# Patient Record
Sex: MALE | Race: WHITE | URBAN - METROPOLITAN AREA
[De-identification: names, ages, dates, MRNs, and addresses within clinical notes are randomized per-mention and may not be internally consistent; named-entity substitution may affect disease eponyms.]

---

## 2017-02-08 ENCOUNTER — FOLLOW UP ESTABLISHED (OUTPATIENT)
Dept: URBAN - METROPOLITAN AREA CLINIC 85 | Facility: CLINIC | Age: 82
End: 2017-02-08
Payer: MEDICARE

## 2017-02-08 DIAGNOSIS — H25.11 AGE-RELATED NUCLEAR CATARACT, RIGHT EYE: ICD-10-CM

## 2017-02-08 DIAGNOSIS — H25.812 COMBINED FORMS OF AGE-RELATED CATARACT, LEFT EYE: Primary | ICD-10-CM

## 2017-02-08 PROCEDURE — 92134 CPTRZ OPH DX IMG PST SGM RTA: CPT | Performed by: OPTOMETRIST

## 2017-02-08 PROCEDURE — 99214 OFFICE O/P EST MOD 30 MIN: CPT | Performed by: OPTOMETRIST

## 2017-02-08 ASSESSMENT — INTRAOCULAR PRESSURE
OD: 18
OS: 19

## 2017-02-08 ASSESSMENT — VISUAL ACUITY: OS: 20/50

## 2017-02-20 ENCOUNTER — NEW PATIENT (OUTPATIENT)
Dept: URBAN - METROPOLITAN AREA CLINIC 85 | Facility: CLINIC | Age: 82
End: 2017-02-20
Payer: MEDICARE

## 2017-02-20 DIAGNOSIS — Z79.84 LONG TERM (CURRENT) USE OF ORAL ANTIDIABETIC DRUGS: ICD-10-CM

## 2017-02-20 DIAGNOSIS — H25.13 AGE-RELATED NUCLEAR CATARACT, BILATERAL: Primary | ICD-10-CM

## 2017-02-20 PROCEDURE — 92004 COMPRE OPH EXAM NEW PT 1/>: CPT | Performed by: OPHTHALMOLOGY

## 2017-02-20 PROCEDURE — 92015 DETERMINE REFRACTIVE STATE: CPT | Performed by: OPHTHALMOLOGY

## 2017-02-20 RX ORDER — PROPYLENE GLYCOL 0.06 MG/ML
0.6 % EMULSION OPHTHALMIC
Qty: 0 | Refills: 0 | Status: INACTIVE
Start: 2017-02-20 | End: 2020-08-26

## 2017-02-20 RX ORDER — PREDNISOLONE ACETATE 10 MG/ML
1 % SUSPENSION/ DROPS OPHTHALMIC
Qty: 1 | Refills: 1 | Status: INACTIVE
Start: 2017-02-20 | End: 2017-10-19

## 2017-02-20 RX ORDER — OFLOXACIN 3 MG/ML
0.3 % SOLUTION/ DROPS OPHTHALMIC
Qty: 1 | Refills: 1 | Status: INACTIVE
Start: 2017-02-20 | End: 2017-03-16

## 2017-02-20 ASSESSMENT — VISUAL ACUITY
OD: 20/25
OS: 20/40

## 2017-02-20 ASSESSMENT — KERATOMETRY
OS: 43.00
OD: 43.00

## 2017-02-20 ASSESSMENT — INTRAOCULAR PRESSURE
OD: 18
OS: 18

## 2017-03-08 ENCOUNTER — SURGERY (OUTPATIENT)
Dept: URBAN - METROPOLITAN AREA SURGERY 55 | Facility: SURGERY | Age: 82
End: 2017-03-08
Payer: MEDICARE

## 2017-03-08 PROCEDURE — 66984 XCAPSL CTRC RMVL W/O ECP: CPT | Performed by: OPHTHALMOLOGY

## 2017-03-09 ENCOUNTER — POST OP (OUTPATIENT)
Dept: URBAN - METROPOLITAN AREA CLINIC 85 | Facility: CLINIC | Age: 82
End: 2017-03-09

## 2017-03-09 PROCEDURE — 99024 POSTOP FOLLOW-UP VISIT: CPT | Performed by: OPTOMETRIST

## 2017-03-09 ASSESSMENT — INTRAOCULAR PRESSURE: OS: 17

## 2017-03-16 ENCOUNTER — POST OP (OUTPATIENT)
Dept: URBAN - METROPOLITAN AREA CLINIC 85 | Facility: CLINIC | Age: 82
End: 2017-03-16

## 2017-03-16 PROCEDURE — 99024 POSTOP FOLLOW-UP VISIT: CPT | Performed by: OPHTHALMOLOGY

## 2017-03-16 ASSESSMENT — INTRAOCULAR PRESSURE
OS: 25
OD: 22

## 2017-03-16 ASSESSMENT — VISUAL ACUITY
OD: 20/25
OS: 20/25

## 2017-03-22 ENCOUNTER — SURGERY (OUTPATIENT)
Dept: URBAN - METROPOLITAN AREA SURGERY 55 | Facility: SURGERY | Age: 82
End: 2017-03-22
Payer: MEDICARE

## 2017-03-22 PROCEDURE — 66984 XCAPSL CTRC RMVL W/O ECP: CPT | Performed by: OPHTHALMOLOGY

## 2017-03-23 ENCOUNTER — POST OP (OUTPATIENT)
Dept: URBAN - METROPOLITAN AREA CLINIC 85 | Facility: CLINIC | Age: 82
End: 2017-03-23

## 2017-03-23 DIAGNOSIS — Z96.1 PRESENCE OF INTRAOCULAR LENS: Primary | ICD-10-CM

## 2017-03-23 PROCEDURE — 99024 POSTOP FOLLOW-UP VISIT: CPT | Performed by: OPTOMETRIST

## 2017-03-23 RX ORDER — DICLOFENAC SODIUM 1 MG/ML
0.1 % SOLUTION/ DROPS OPHTHALMIC
Qty: 0 | Refills: 0 | Status: INACTIVE
Start: 2017-03-23 | End: 2017-04-13

## 2017-03-23 RX ORDER — OFLOXACIN 3 MG/ML
0.3 % SOLUTION/ DROPS OPHTHALMIC
Qty: 0 | Refills: 0 | Status: INACTIVE
Start: 2017-03-23 | End: 2017-04-13

## 2017-03-23 ASSESSMENT — INTRAOCULAR PRESSURE: OD: 18

## 2017-04-13 ENCOUNTER — POST OP (OUTPATIENT)
Dept: URBAN - METROPOLITAN AREA CLINIC 85 | Facility: CLINIC | Age: 82
End: 2017-04-13

## 2017-04-13 PROCEDURE — 99024 POSTOP FOLLOW-UP VISIT: CPT | Performed by: OPHTHALMOLOGY

## 2017-04-13 ASSESSMENT — VISUAL ACUITY
OD: 20/20
OS: 20/20

## 2017-10-19 ENCOUNTER — FOLLOW UP ESTABLISHED (OUTPATIENT)
Dept: URBAN - METROPOLITAN AREA CLINIC 85 | Facility: CLINIC | Age: 82
End: 2017-10-19
Payer: MEDICARE

## 2017-10-19 PROCEDURE — 92014 COMPRE OPH EXAM EST PT 1/>: CPT | Performed by: OPHTHALMOLOGY

## 2017-10-19 ASSESSMENT — VISUAL ACUITY
OD: 20/20
OS: 20/25

## 2017-10-19 ASSESSMENT — INTRAOCULAR PRESSURE
OS: 15
OD: 13

## 2019-03-04 ENCOUNTER — FOLLOW UP ESTABLISHED (OUTPATIENT)
Dept: URBAN - METROPOLITAN AREA CLINIC 85 | Facility: CLINIC | Age: 84
End: 2019-03-04
Payer: MEDICARE

## 2019-03-04 DIAGNOSIS — E11.9 TYPE 2 DIABETES MELLITUS WITHOUT COMPLICATIONS: Primary | ICD-10-CM

## 2019-03-04 PROCEDURE — 92014 COMPRE OPH EXAM EST PT 1/>: CPT | Performed by: OPTOMETRIST

## 2019-03-04 ASSESSMENT — INTRAOCULAR PRESSURE
OS: 19
OD: 17

## 2019-03-04 ASSESSMENT — VISUAL ACUITY
OD: 20/25
OS: 20/25

## 2020-08-26 ENCOUNTER — FOLLOW UP ESTABLISHED (OUTPATIENT)
Dept: URBAN - METROPOLITAN AREA CLINIC 85 | Facility: CLINIC | Age: 85
End: 2020-08-26
Payer: MEDICARE

## 2020-08-26 DIAGNOSIS — H35.372 PUCKERING OF MACULA, LEFT EYE: ICD-10-CM

## 2020-08-26 DIAGNOSIS — H04.209 UNSPECIFIED EPIPHORA, UNSPECIFIED SIDE: ICD-10-CM

## 2020-08-26 DIAGNOSIS — H26.493 OTHER SECONDARY CATARACT, BILATERAL: ICD-10-CM

## 2020-08-26 PROCEDURE — 92014 COMPRE OPH EXAM EST PT 1/>: CPT | Performed by: OPTOMETRIST

## 2020-08-26 ASSESSMENT — INTRAOCULAR PRESSURE
OS: 16
OD: 14

## 2020-08-26 ASSESSMENT — VISUAL ACUITY
OS: 20/20
OD: 20/20

## 2021-01-01 ENCOUNTER — OFFICE VISIT (OUTPATIENT)
Dept: URBAN - METROPOLITAN AREA CLINIC 85 | Facility: CLINIC | Age: 86
End: 2021-01-01
Payer: MEDICARE

## 2021-01-01 DIAGNOSIS — H40.1111 PRIMARY OPEN-ANGLE GLAUCOMA, MILD STAGE, RIGHT EYE: ICD-10-CM

## 2021-01-01 DIAGNOSIS — H40.1122 PRIMARY OPEN-ANGLE GLAUCOMA, MODERATE STAGE, LEFT EYE: Primary | ICD-10-CM

## 2021-01-01 DIAGNOSIS — H40.013 OPEN ANGLE WITH BORDERLINE FINDINGS, LOW RISK, BILATERAL: ICD-10-CM

## 2021-01-01 PROCEDURE — 92012 INTRM OPH EXAM EST PATIENT: CPT | Performed by: OPTOMETRIST

## 2021-01-01 PROCEDURE — 99214 OFFICE O/P EST MOD 30 MIN: CPT | Performed by: OPTOMETRIST

## 2021-01-01 PROCEDURE — 92020 GONIOSCOPY: CPT | Performed by: OPTOMETRIST

## 2021-01-01 PROCEDURE — 92134 CPTRZ OPH DX IMG PST SGM RTA: CPT | Performed by: OPTOMETRIST

## 2021-01-01 PROCEDURE — 76514 ECHO EXAM OF EYE THICKNESS: CPT | Performed by: OPTOMETRIST

## 2021-01-01 PROCEDURE — 92083 EXTENDED VISUAL FIELD XM: CPT | Performed by: OPTOMETRIST

## 2021-01-01 PROCEDURE — 92133 CPTRZD OPH DX IMG PST SGM ON: CPT | Performed by: OPTOMETRIST

## 2021-01-01 RX ORDER — BRIMONIDINE TARTRATE 2 MG/ML
0.2 % SOLUTION/ DROPS OPHTHALMIC
Qty: 1 | Refills: 2 | Status: ACTIVE
Start: 2021-01-01

## 2021-01-01 RX ORDER — TIMOLOL MALEATE 5 MG/ML
0.5 % SOLUTION/ DROPS OPHTHALMIC
Qty: 1 | Refills: 3 | Status: INACTIVE
Start: 2021-01-01 | End: 2021-01-01

## 2021-01-01 ASSESSMENT — INTRAOCULAR PRESSURE
OD: 20
OS: 30
OD: 17
OS: 26
OD: 19
OS: 24
OS: 19
OD: 20

## 2021-01-01 ASSESSMENT — VISUAL ACUITY
OD: 20/20
OS: 20/20

## 2021-08-09 NOTE — IMPRESSION/PLAN
Impression: Type 2 diabetes mellitus without complications: A81.6. Plan: No diabetic retinopathy. Recommend yearly diabetic eye exam. Discussed with patient importance of good blood sugar control and compliance with regular visits with PCP, compliance with medications, healthy diet and daily exercise.

## 2021-08-09 NOTE — IMPRESSION/PLAN
Impression: Open angle with borderline findings, low risk, bilateral: H40.013. Plan: Discussed need for further glaucoma testing soon. RTC next week for 24-2 CVF, IOP, pach, or asap if pain or decreased VA.

## 2021-08-16 NOTE — IMPRESSION/PLAN
Impression: Primary open-angle glaucoma, moderate stage, left eye: H40.1122. Plan: Discussed diagnosis in detail and importance of compliance with follow up and any prescribed treatment. Any loss of function due to glaucomatous vision loss is irreversible. Treatment will not improve vision. Reviewed various treatment options with patient (both medical and surgical). Patient educated that treatment will not restore any loss of visual function and goal of treatment is to slow progression. Discussed potential side effects of medications/treatment. Initiate Timolol OS BID. RTC 3-4 weeks for IOP check or ASAP if decreased VA, pain or worsening of condition.

## 2021-09-20 NOTE — IMPRESSION/PLAN
Impression: Primary open-angle glaucoma, moderate stage, left eye: H40.1122. Plan: Discussed diagnosis in detail and importance of compliance with follow up and any prescribed treatment. Any loss of function due to glaucomatous vision loss is irreversible. D/C Timolol due to not effective. RTC 4 weeks for IOP check or ASAP if decreased VA, pain or worsening of condition. Consider Dorzolamide or SLT.

## 2021-10-18 NOTE — IMPRESSION/PLAN
Impression: Primary open-angle glaucoma, moderate stage, left eye: H40.3988. Plan: Continue Brimonidine BID OS. Acceptable IOP. RTC 4 months for IOP check or ASAP if decreased VA, pain or worsening of condition. Consider Dorzolamide or SLT.